# Patient Record
(demographics unavailable — no encounter records)

---

## 2024-11-01 NOTE — PHYSICAL EXAM
[JVD] : no jugular venous distention  [Normal Breath Sounds] : Normal breath sounds [Normal Heart Sounds] : normal heart sounds [Abdomen Tenderness] : ~T ~M Abdominal tenderness [No HSM] : no hepatosplenomegaly [No Rash or Lesion] : No rash or lesion [Alert] : alert [Oriented to Person] : oriented to person [Oriented to Place] : oriented to place [Oriented to Time] : oriented to time [Calm] : calm [de-identified] : NAD [de-identified] : NC/AT PER [de-identified] : soft, + LIH. R side normal [de-identified] : no testicular masses [de-identified] : moves all 4 extr 5/5

## 2024-11-01 NOTE — HISTORY OF PRESENT ILLNESS
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 Now interested in surgery. Occ pain. No GI obstruction.

## 2024-11-01 NOTE — CONSULT LETTER
[Dear  ___] : Dear  [unfilled], [Consult Letter:] : I had the pleasure of evaluating your patient, [unfilled]. [Please see my note below.] : Please see my note below. [Consult Closing:] : Thank you very much for allowing me to participate in the care of this patient.  If you have any questions, please do not hesitate to contact me. [Sincerely,] : Sincerely, [FreeTextEntry3] : Wm Eduardo DUKES [DrSaniya  ___] : Dr. RUTH

## 2024-12-27 NOTE — PHYSICAL EXAM
[Normal Breath Sounds] : Normal breath sounds [Normal Heart Sounds] : normal heart sounds [Calm] : calm [de-identified] : soft, no infection or recurrence

## 2025-02-10 NOTE — DATA REVIEWED
[de-identified] :  Type A tymp AD. Type C tymp AS. Testing via inserts: AD- WNL. AS- Borderline normal / mild essentially SNHL w/ moderate component at 8kHz. Recs: 1) F/u w/ MD 2) Re-eval post med intervention

## 2025-02-10 NOTE — ASSESSMENT
[FreeTextEntry1] : as tm dull no perf noted audio mixed cond loss as c tymp tinnitus ?sudden sn loss as jazz resolving pred 20 bid and taper f/u 2 weeks

## 2025-02-10 NOTE — CONSULT LETTER
[Consult Letter:] : I had the pleasure of evaluating your patient, [unfilled]. [Please see my note below.] : Please see my note below. [Consult Closing:] : Thank you very much for allowing me to participate in the care of this patient.  If you have any questions, please do not hesitate to contact me. [Sincerely,] : Sincerely, [FreeTextEntry1] : Dear Dr. LAURENT MUNGUIA,  Thank you for your kind referral. Please refer to my enclosed office notes for MAGALYS DUFFY . If there are any questions free to contact me. [FreeTextEntry3] : Byron Live MD, FACS

## 2025-02-10 NOTE — PROCEDURE
[FreeTextEntry6] : Indication: Cannot adequately examine ear canal/tympanic membrane with otoscope. Findings left tm dull, no perf noted

## 2025-02-10 NOTE — HISTORY OF PRESENT ILLNESS
[de-identified] : 6 d ago sneeze and as pop sinusitis and nasal congestion as ringing  to Urgent care and dx w acute om and as perf rx antibiotics, no pain still plugging

## 2025-02-10 NOTE — REVIEW OF SYSTEMS
[Recurrent Ear Infections] : recurrent ear infections [Ear Noises] : ear noises [Sinus Pain] : sinus pain [Sinus Pressure] : sinus pressure [Patient Intake Form Reviewed] : Patient intake form was reviewed [Negative] : Nasal [Ear Pain] : no ear pain [Ear Itch] : no ear itch [Hearing Loss] : no hearing loss [Dizziness] : no dizziness [Ear Drainage] : no ear drainage [Lightheadedness] : no lightheadedness [de-identified] : rupture left ear drum  [de-identified] : had sinus infection last Wednesday its better now

## 2025-02-10 NOTE — REASON FOR VISIT
[Initial Consultation] : an initial consultation for [Tinnitus] : tinnitus [FreeTextEntry2] : left rupture ear drum

## 2025-03-06 NOTE — DATA REVIEWED
[de-identified] :   Type A arik, AU. Re-eval via inserts:  - 8000Hz, AU. Recs: 1) F/u w/ MD 2) Annual

## 2025-03-06 NOTE — HISTORY OF PRESENT ILLNESS
[de-identified] : 42 year old male presents for ear f/u Completed Prednisone States relief w ringing and hearing Denies nasal congestion Doing well, asymptomatic

## 2025-03-06 NOTE — PROCEDURE
[FreeTextEntry6] : Indication: Cannot adequately examine ear canal/tympanic membrane with otoscope. Findings as tm intact mobile